# Patient Record
Sex: FEMALE | Race: WHITE | NOT HISPANIC OR LATINO | Employment: UNEMPLOYED | URBAN - METROPOLITAN AREA
[De-identification: names, ages, dates, MRNs, and addresses within clinical notes are randomized per-mention and may not be internally consistent; named-entity substitution may affect disease eponyms.]

---

## 2019-12-27 ENCOUNTER — OFFICE VISIT (OUTPATIENT)
Dept: FAMILY MEDICINE CLINIC | Facility: CLINIC | Age: 64
End: 2019-12-27
Payer: MEDICARE

## 2019-12-27 VITALS
HEIGHT: 66 IN | HEART RATE: 81 BPM | WEIGHT: 157 LBS | BODY MASS INDEX: 25.23 KG/M2 | DIASTOLIC BLOOD PRESSURE: 100 MMHG | SYSTOLIC BLOOD PRESSURE: 170 MMHG | OXYGEN SATURATION: 98 % | TEMPERATURE: 97.2 F | RESPIRATION RATE: 18 BRPM

## 2019-12-27 DIAGNOSIS — I10 HYPERTENSION, UNSPECIFIED TYPE: ICD-10-CM

## 2019-12-27 DIAGNOSIS — B19.20 HEPATITIS C VIRUS INFECTION WITHOUT HEPATIC COMA, UNSPECIFIED CHRONICITY: Primary | ICD-10-CM

## 2019-12-27 DIAGNOSIS — R07.9 CHEST PAIN, UNSPECIFIED TYPE: ICD-10-CM

## 2019-12-27 DIAGNOSIS — R06.02 SHORTNESS OF BREATH: ICD-10-CM

## 2019-12-27 PROCEDURE — 99213 OFFICE O/P EST LOW 20 MIN: CPT | Performed by: FAMILY MEDICINE

## 2019-12-27 RX ORDER — METOPROLOL SUCCINATE 50 MG/1
50 TABLET, EXTENDED RELEASE ORAL DAILY
Qty: 30 TABLET | Refills: 3 | Status: SHIPPED | OUTPATIENT
Start: 2019-12-27 | End: 2020-11-23

## 2019-12-27 NOTE — PROGRESS NOTES
[address]    NAME: Michael Stack  AGE: 60 y o  SEX: female  : 1955     DATE: 2019     Assessment and Plan:     Problem List Items Addressed This Visit     None      Visit Diagnoses     Hepatitis C virus infection without hepatic coma, unspecified chronicity    -  Primary    Relevant Orders    Hepatitis panel, acute    Hepatitis C RNA, quantitative, PCR    Hypertension, unspecified type        Relevant Medications    metoprolol succinate (TOPROL-XL) 50 mg 24 hr tablet    Other Relevant Orders    Lipid Panel with Direct LDL reflex    Comprehensive metabolic panel    CBC and differential    TSH, 3rd generation with Free T4 reflex    Ambulatory referral to Cardiology    Shortness of breath        Relevant Orders    Ambulatory referral to Cardiology    Chest pain, unspecified type        Relevant Orders    Ambulatory referral to Cardiology        Explained to patient and provided her with note for the rehabilitation home that she must go to the ER the next time she experiences chest pain  She is limited to 1 appointment per month, so expressed to patient the need to make Cardiology appointment as her priority  Will follow up with patient in 2-3 months  Immunizations and preventive care screenings were discussed with patient today  Appropriate education was printed on patient's after visit summary  Counseling:  Alcohol/drug use: discussed moderation in alcohol intake, the recommendations for healthy alcohol use, and avoidance of illicit drug use  Dental Health: discussed importance of regular tooth brushing, flossing, and dental visits  Injury prevention: discussed safety/seat belts, safety helmets, smoke detectors, carbon dioxide detectors, and smoking near bedding or upholstery  · Exercise: the importance of regular exercise/physical activity was discussed  Recommend exercise 3-5 times per week for at least 30 minutes  No follow-ups on file  Chief Complaint:     Chief Complaint   Patient presents with    Physical Exam     New Patient, has Hep C and needs blood pressure medication  Was taking Metoprolol 50mg      History of Present Illness:     Adult Annual Physical   Patient here for a comprehensive physical exam  The patient reports she is currently living in the [facility]  About 5 years ago her daughter was killed and after that she became addicted to pain medication  At that time she was taking about 30 Percocet pills per week  Adbout a year ago she started using Heroine, about 100 doollars worth per day  She checked herself into the [facility] for rehab and has not used any drugs since then  While at the rehab, she was not allowed to take her blood pressure medication Metoprolol 50mg daily  She has noticed that for the past few weeks she has been experiencing worsening of shortness of breath and chest pain  The shortness of breath is worse when she is walking, going up stairs and laying flat  She states experiencing crushing chest pain, and the sensation as though something heavy is sitting on her chest  Last time she had symptoms was yesterday  Denies any chest pain today  Patient diagnosed with Hep C in the past, but was never treated  Likely acquired from IV drug use  She has never seen a GI  Diet and Physical Activity  · Diet/Nutrition: poor diet  · Exercise: no formal exercise  Depression Screening  PHQ-9 Depression Screening    PHQ-9:    Frequency of the following problems over the past two weeks:            General Health  · Sleep: sleeps well and gets 4-6 hours of sleep on average  · Hearing: normal - bilateral   · Vision: no vision problems  · Dental: Cannot go to dentist since she is in rehab  /GYN Health  · Patient is: postmenopausal  · Last menstrual period: Several years ago   · Contraceptive method: not sexually active        Review of Systems:     Review of Systems   Constitutional: Positive for fatigue  Negative for chills and fever  HENT: Negative  Respiratory: Positive for shortness of breath  Negative for apnea, cough and wheezing  Cardiovascular: Positive for chest pain and palpitations  Negative for leg swelling  Gastrointestinal: Negative  Genitourinary: Negative  Musculoskeletal: Negative  Neurological: Negative  Psychiatric/Behavioral: Negative  Past Medical History:     Past Medical History:   Diagnosis Date    Hepatitis C     High blood pressure       Past Surgical History:     Past Surgical History:   Procedure Laterality Date    APPENDECTOMY        Social History:     Social History     Socioeconomic History    Marital status: Single     Spouse name: None    Number of children: None    Years of education: None    Highest education level: None   Occupational History    None   Social Needs    Financial resource strain: None    Food insecurity:     Worry: None     Inability: None    Transportation needs:     Medical: None     Non-medical: None   Tobacco Use    Smoking status: Former Smoker    Smokeless tobacco: Never Used   Substance and Sexual Activity    Alcohol use: Never     Frequency: Never    Drug use: Not Currently    Sexual activity: Not Currently   Lifestyle    Physical activity:     Days per week: None     Minutes per session: None    Stress: None   Relationships    Social connections:     Talks on phone: None     Gets together: None     Attends Jainism service: None     Active member of club or organization: None     Attends meetings of clubs or organizations: None     Relationship status: None    Intimate partner violence:     Fear of current or ex partner: None     Emotionally abused: None     Physically abused: None     Forced sexual activity: None   Other Topics Concern    None   Social History Narrative    None      Family History:     History reviewed  No pertinent family history  Current Medications:     Current Outpatient Medications   Medication Sig Dispense Refill    metoprolol succinate (TOPROL-XL) 50 mg 24 hr tablet Take 1 tablet (50 mg total) by mouth daily 30 tablet 3     No current facility-administered medications for this visit  Allergies:     No Known Allergies   Physical Exam:     /100 (BP Location: Left arm, Patient Position: Sitting, Cuff Size: Adult)   Pulse 81   Temp (!) 97 2 °F (36 2 °C) (Tympanic)   Resp 18   Ht 5' 6" (1 676 m)   Wt 71 2 kg (157 lb)   SpO2 98%   BMI 25 34 kg/m²     Physical Exam   Constitutional: She is oriented to person, place, and time  She appears well-developed and well-nourished  No distress  HENT:   Head: Normocephalic and atraumatic  Right Ear: External ear normal    Left Ear: External ear normal    Eyes: Pupils are equal, round, and reactive to light  EOM are normal    Neck: Normal range of motion  Neck supple  Cardiovascular: Normal rate, regular rhythm and intact distal pulses  Exam reveals no friction rub  No murmur heard  Pulmonary/Chest: Effort normal and breath sounds normal  No respiratory distress  She has no wheezes  She exhibits no tenderness  Abdominal: Soft  Bowel sounds are normal  She exhibits no distension and no mass  There is no tenderness  There is no rebound and no guarding  Musculoskeletal: Normal range of motion  She exhibits no edema, tenderness or deformity  Neurological: She is alert and oriented to person, place, and time  Skin: Skin is warm and dry  No rash noted  She is not diaphoretic  No erythema  No pallor  Psychiatric: She has a normal mood and affect  Her behavior is normal  Judgment and thought content normal    Nursing note and vitals reviewed        Daya Sidhu, DO  [address]

## 2019-12-27 NOTE — LETTER
December 29, 2019     Patient: Lawanna Severin   YOB: 1955   Date of Visit: 12/27/2019       To Whom it May Concern:    Lawanna Severin is under my professional care  She was seen in my office on 12/27/2019  It is strongly recommended that she get her blood work done today,  as ordered by me during our office visit  It has been advised to the patient that the next time she experiences CHEST PAIN, 911 must be called immediately and she be taken to the ER  If you have any questions or concerns, please don't hesitate to call           Sincerely,          Angela Cash, DO

## 2019-12-31 LAB
ALBUMIN SERPL-MCNC: 4.2 G/DL (ref 3.6–4.8)
ALBUMIN/GLOB SERPL: 1.4 {RATIO} (ref 1.2–2.2)
ALP SERPL-CCNC: 98 IU/L (ref 39–117)
ALT SERPL-CCNC: 64 IU/L (ref 0–32)
AST SERPL-CCNC: 57 IU/L (ref 0–40)
BASOPHILS # BLD AUTO: 0 X10E3/UL (ref 0–0.2)
BASOPHILS NFR BLD AUTO: 0 %
BILIRUB SERPL-MCNC: 0.5 MG/DL (ref 0–1.2)
BUN SERPL-MCNC: 19 MG/DL (ref 8–27)
BUN/CREAT SERPL: 21 (ref 12–28)
CALCIUM SERPL-MCNC: 9.4 MG/DL (ref 8.7–10.3)
CHLORIDE SERPL-SCNC: 105 MMOL/L (ref 96–106)
CHOLEST SERPL-MCNC: 144 MG/DL (ref 100–199)
CO2 SERPL-SCNC: 23 MMOL/L (ref 20–29)
CREAT SERPL-MCNC: 0.9 MG/DL (ref 0.57–1)
EOSINOPHIL # BLD AUTO: 0.1 X10E3/UL (ref 0–0.4)
EOSINOPHIL NFR BLD AUTO: 2 %
ERYTHROCYTE [DISTWIDTH] IN BLOOD BY AUTOMATED COUNT: 13.4 % (ref 12.3–15.4)
GLOBULIN SER-MCNC: 2.9 G/DL (ref 1.5–4.5)
GLUCOSE SERPL-MCNC: 116 MG/DL (ref 65–99)
HAV IGM SERPL QL IA: NEGATIVE
HBV CORE IGM SERPL QL IA: NEGATIVE
HBV SURFACE AG SERPL QL IA: NEGATIVE
HCT VFR BLD AUTO: 32.5 % (ref 34–46.6)
HCV AB S/CO SERPL IA: >11 S/CO RATIO (ref 0–0.9)
HCV RNA SERPL NAA+PROBE-ACNC: NORMAL IU/ML
HCV RNA SERPL NAA+PROBE-LOG IU: 5.49 LOG10 IU/ML
HDLC SERPL-MCNC: 47 MG/DL
HGB BLD-MCNC: 11.8 G/DL (ref 11.1–15.9)
IMM GRANULOCYTES # BLD: 0 X10E3/UL (ref 0–0.1)
IMM GRANULOCYTES NFR BLD: 0 %
LDLC SERPL CALC-MCNC: 80 MG/DL (ref 0–99)
LDLC SERPL DIRECT ASSAY-MCNC: 86 MG/DL (ref 0–99)
LYMPHOCYTES # BLD AUTO: 2 X10E3/UL (ref 0.7–3.1)
LYMPHOCYTES NFR BLD AUTO: 43 %
MCH RBC QN AUTO: 32.5 PG (ref 26.6–33)
MCHC RBC AUTO-ENTMCNC: 36.3 G/DL (ref 31.5–35.7)
MCV RBC AUTO: 90 FL (ref 79–97)
MONOCYTES # BLD AUTO: 0.4 X10E3/UL (ref 0.1–0.9)
MONOCYTES NFR BLD AUTO: 8 %
NEUTROPHILS # BLD AUTO: 2.1 X10E3/UL (ref 1.4–7)
NEUTROPHILS NFR BLD AUTO: 47 %
PLATELET # BLD AUTO: 173 X10E3/UL (ref 150–450)
POTASSIUM SERPL-SCNC: 3.8 MMOL/L (ref 3.5–5.2)
PROT SERPL-MCNC: 7.1 G/DL (ref 6–8.5)
RBC # BLD AUTO: 3.63 X10E6/UL (ref 3.77–5.28)
SL AMB EGFR AFRICAN AMERICAN: 79 ML/MIN/1.73
SL AMB EGFR NON AFRICAN AMERICAN: 69 ML/MIN/1.73
SL AMB VLDL CHOLESTEROL CALC: 17 MG/DL (ref 5–40)
SODIUM SERPL-SCNC: 142 MMOL/L (ref 134–144)
TEST INFORMATION: NORMAL
TRIGL SERPL-MCNC: 87 MG/DL (ref 0–149)
TSH SERPL DL<=0.005 MIU/L-ACNC: 1.16 UIU/ML (ref 0.45–4.5)
WBC # BLD AUTO: 4.6 X10E3/UL (ref 3.4–10.8)

## 2020-01-17 ENCOUNTER — CONSULT (OUTPATIENT)
Dept: CARDIOLOGY CLINIC | Facility: CLINIC | Age: 65
End: 2020-01-17
Payer: MEDICARE

## 2020-01-17 VITALS
HEIGHT: 66 IN | BODY MASS INDEX: 26.2 KG/M2 | HEART RATE: 78 BPM | SYSTOLIC BLOOD PRESSURE: 130 MMHG | OXYGEN SATURATION: 98 % | DIASTOLIC BLOOD PRESSURE: 70 MMHG | WEIGHT: 163 LBS

## 2020-01-17 DIAGNOSIS — Z82.49 FAMILY HISTORY OF EARLY CAD: ICD-10-CM

## 2020-01-17 DIAGNOSIS — R06.02 SHORTNESS OF BREATH: ICD-10-CM

## 2020-01-17 DIAGNOSIS — R07.9 CHEST PAIN, UNSPECIFIED TYPE: ICD-10-CM

## 2020-01-17 DIAGNOSIS — I10 ESSENTIAL HYPERTENSION: ICD-10-CM

## 2020-01-17 DIAGNOSIS — B19.20 HEPATITIS C VIRUS INFECTION WITHOUT HEPATIC COMA, UNSPECIFIED CHRONICITY: ICD-10-CM

## 2020-01-17 DIAGNOSIS — R74.8 ELEVATED LIVER ENZYMES: ICD-10-CM

## 2020-01-17 PROCEDURE — 93000 ELECTROCARDIOGRAM COMPLETE: CPT | Performed by: INTERNAL MEDICINE

## 2020-01-17 PROCEDURE — 99205 OFFICE O/P NEW HI 60 MIN: CPT | Performed by: INTERNAL MEDICINE

## 2020-01-17 NOTE — PROGRESS NOTES
Consultation - Cardiology Office  [facility]  Lee Benites 60 y o  female MRN: [id number]  : 1955  Unit/Bed#:  Encounter: [id number]      Assessment:     1  Chest pain, unspecified type    2  Shortness of breath    3  Family history of early CAD    3  Essential hypertension    5  Elevated liver enzymes    6  Hepatitis C virus infection without hepatic coma, unspecified chronicity        Discussion summary and Plan:    1  Chest pain  Patient is having now chest pain with exertion particularly when she climb stairs  She used to be a walker now she could not do stuff because she get episodes of chest pressure with shortness of breath and she feels better when she slows down  This has been going on progressively for the last 3-4 months  In view of that due to her risk factor she will scheduled for cardiac catheterization all risks benefit all discussed with the patient including risk of bleeding heart attack stroke even death she agrees with the plan  Will check echo Doppler for LV function  2  Shortness of breath with exertion  Patient was able to do those activities before  Need to rule out cardiomyopathy and obstructive disease  Scheduled for cardiac catheterization check echo Doppler    3  Family history of coronary artery disease  Both her parents have heart attack in early 62s  Father  of MI     4  Elevated liver enzyme  During workup patient is found to have hepatitis C positive  Refer her to GI    5  Essential hypertension  Seems to be well controlled with metoprolol XL  Labs ordered  Echo Doppler schedule cardiac catheterization schedule she need to follow up with Gastroenterology regarding her hep C  All risks benefit alternate discussed with her she wishes to proceed for cardiac catheterization  Thank you for your consultation  If you have any question please call me at [phone]    Counseling :  A description of the counseling    Goals and Barriers  Patient's ability to self care: Yes  Medication side effect reviewed with patient in detail and all their questions answered to their satisfaction  Primary Care Physician Requesting Consult: Ivonne Agosto MD    Reason for Consult / Principal Problem:         HPI :     Nitza Dixon is a 60y o  year old female who was referred by primary care doctor for episodes of shortness of breath and chest pain  Patient who works as a medical assistant has noted for the last for 5 months she gets episodes of chest pain and shortness of breath when she climbs stairs  She who was a walker can easily walk few miles now cut down her walking as she get chest pressure and shortness of breath  She has to stop to breathe better and then the pressure goes away  She has noted this last 3 4 months and is progressively getting worse  Recently blood test shows she had hepatitis C and has mildly elevated liver enzyme  Her other electrolytes are acceptable  She can easily climb hill earlier now she does not does lose activities  She was started on metoprolol XL 50 mg daily it did help little bit but not completely  It decreased episodes but not fully gone  She is not taking any aspirin or cholesterol medicine at this time  Today her heart rate is 70 beats per minute and she has a nonspecific ST changes on EKG  No nausea no vomiting no fever no chills  She has a family history of heart problem both her parents had MI in the 62s  Mother had stents father  of MI in the 62s  One of the other sister has heart problems  She used to smoke about 2 packs a day she quit 3 years ago  Currently she is not smoking  Review of Systems   Constitutional: Positive for fatigue  Negative for activity change, chills, diaphoresis, fever and unexpected weight change  HENT: Negative for congestion  Eyes: Negative for discharge and redness  Respiratory: Positive for shortness of breath   Negative for cough, chest tightness and wheezing  Cardiovascular: Positive for chest pain  Negative for palpitations and leg swelling  Gastrointestinal: Negative for abdominal pain, diarrhea and nausea  Endocrine: Negative  Genitourinary: Negative for decreased urine volume and urgency  Musculoskeletal: Negative  Negative for arthralgias, back pain and gait problem  Skin: Negative for rash and wound  Allergic/Immunologic: Negative  Neurological: Negative for dizziness, seizures, syncope, weakness, light-headedness and headaches  Hematological: Negative  Psychiatric/Behavioral: Negative for agitation and confusion  The patient is nervous/anxious  Historical Information   Past Medical History:   Diagnosis Date    Hepatitis C     High blood pressure      Past Surgical History:   Procedure Laterality Date    APPENDECTOMY       Social History     Substance and Sexual Activity   Alcohol Use Never    Frequency: Never     Social History     Substance and Sexual Activity   Drug Use Not Currently     Social History     Tobacco Use   Smoking Status Former Smoker   Smokeless Tobacco Never Used     Family History:   Family History   Problem Relation Age of Onset    Heart attack Mother     Heart attack Father        Meds/Allergies     No Known Allergies    Current Outpatient Medications:     metoprolol succinate (TOPROL-XL) 50 mg 24 hr tablet, Take 1 tablet (50 mg total) by mouth daily, Disp: 30 tablet, Rfl: 3    Vitals: Blood pressure 130/70, pulse 78, height 5' 6" (1 676 m), weight 73 9 kg (163 lb), SpO2 98 %  Body mass index is 26 31 kg/m²    Vitals:    01/17/20 1350   Weight: 73 9 kg (163 lb)     BP Readings from Last 3 Encounters:   01/17/20 130/70   12/27/19 170/100         Physical Exam    Neurologic:  Alert & oriented x 3, no new focal deficits, Not in any acute distress,  Constitutional:  Well developed, well nourished, non-toxic appearance   Eyes:  Pupil equal and reacting to light, conjunctiva normal, No JVP, No LNP   HENT:  Atraumatic, oropharynx moist, Neck- normal range of motion, no tenderness,  Neck supple   Respiratory:  Bilateral air entry, mostly clear to auscultation  Cardiovascular: S1-S2 regular with a 2/6 ejection systolic murmur and S4 is present  GI:  Soft, nondistended, normal bowel sounds, nontender, no hepatosplenomegaly appreciated  Musculoskeletal:  No edema, no tenderness, no deformities  Skin:  Well hydrated, no rash   Lymphatic:  No lymphadenopathy noted   Extremities:  No edema and distal pulses are present    Diagnostic Studies Review Cardio:    None recent    EK lead EKG done 2020 shows normal sinus rhythm nonspecific ST changes heart rate 70 beats per minute    Cardiac testing:     Lab Review   Lab Results   Component Value Date    WBC 4 6 2019    HGB 11 8 2019    HCT 32 5 (L) 2019    MCV 90 2019    RDW 13 4 2019     2019     BMP:  Lab Results   Component Value Date    SODIUM 142 2019    K 3 8 2019     2019    CO2 23 2019    BUN 19 2019    CREATININE 0 90 2019    GLUC 116 (H) 2019     LFT:  Lab Results   Component Value Date    AST 57 (H) 2019    ALT 64 (H) 2019    TP 7 1 2019    ALB 4 2 2019      Lipid Profile:   Lab Results   Component Value Date    CHOLESTEROL 144 2019    HDL 47 2019    TRIG 87 2019     Lab Results   Component Value Date    CHOLESTEROL 144 2019       Recent Results (from the past 672 hour(s))   CBC and differential    Collection Time: 19  2:10 PM   Result Value Ref Range    White Blood Cell Count 4 6 3 4 - 10 8 x10E3/uL    Red Blood Cell Count 3 63 (L) 3 77 - 5 28 x10E6/uL    Hemoglobin 11 8 11 1 - 15 9 g/dL    HCT 32 5 (L) 34 0 - 46 6 %    MCV 90 79 - 97 fL    MCH 32 5 26 6 - 33 0 pg    MCHC 36 3 (H) 31 5 - 35 7 g/dL    RDW 13 4 12 3 - 15 4 %    Platelet Count 197 375 - 450 x10E3/uL    Neutrophils 47 Not Estab  % Lymphocytes 43 Not Estab  %    Monocytes 8 Not Estab  %    Eosinophils 2 Not Estab  %    Basophils PCT 0 Not Estab  %    Neutrophils (Absolute) 2 1 1 4 - 7 0 x10E3/uL    Lymphocytes (Absolute) 2 0 0 7 - 3 1 x10E3/uL    Monocytes (Absolute) 0 4 0 1 - 0 9 x10E3/uL    Eosinophils (Absolute) 0 1 0 0 - 0 4 x10E3/uL    Basophils ABS 0 0 0 0 - 0 2 x10E3/uL    Immature Granulocytes 0 Not Estab  %    Immature Granulocytes (Absolute) 0 0 0 0 - 0 1 x10E3/uL   Comprehensive metabolic panel    Collection Time: 12/27/19  2:10 PM   Result Value Ref Range    Glucose, Random 116 (H) 65 - 99 mg/dL    BUN 19 8 - 27 mg/dL    Creatinine 0 90 0 57 - 1 00 mg/dL    eGFR Non  69 >59 mL/min/1 73    eGFR  79 >59 mL/min/1 73    SL AMB BUN/CREATININE RATIO 21 12 - 28    Sodium 142 134 - 144 mmol/L    Potassium 3 8 3 5 - 5 2 mmol/L    Chloride 105 96 - 106 mmol/L    CO2 23 20 - 29 mmol/L    CALCIUM 9 4 8 7 - 10 3 mg/dL    Protein, Total 7 1 6 0 - 8 5 g/dL    Albumin 4 2 3 6 - 4 8 g/dL    Globulin, Total 2 9 1 5 - 4 5 g/dL    Albumin/Globulin Ratio 1 4 1 2 - 2 2    TOTAL BILIRUBIN 0 5 0 0 - 1 2 mg/dL    Alk Phos Isoenzymes 98 39 - 117 IU/L    AST 57 (H) 0 - 40 IU/L    ALT 64 (H) 0 - 32 IU/L   Lipid panel    Collection Time: 12/27/19  2:10 PM   Result Value Ref Range    Cholesterol, Total 144 100 - 199 mg/dL    Triglycerides 87 0 - 149 mg/dL    HDL 47 >39 mg/dL    VLDL Cholesterol Calculated 17 5 - 40 mg/dL    LDL Direct 80 0 - 99 mg/dL   Hepatitis panel, acute    Collection Time: 12/27/19  2:10 PM   Result Value Ref Range    Hep A Ab, IgM Negative Negative    HBsAg Screen Negative Negative    Hep B Core Ab, IgM Negative Negative    HEP C AB >11 0 (H) 0 0 - 0 9 s/co ratio   HCV RT-PCR, Quant (Non-Graph)    Collection Time: 12/27/19  2:10 PM   Result Value Ref Range    HCV PCR Quantitative 307,260 IU/mL    HCV Quantitative Log 5 488 log10 IU/mL    Test Information Comment    LDL cholesterol, direct    Collection Time: 12/27/19  2:10 PM   Result Value Ref Range    LDL Cholesterol 86 0 - 99 mg/dL   TSH, 3rd generation    Collection Time: 12/27/19  2:10 PM   Result Value Ref Range    TSH 1 160 0 450 - 4 500 uIU/mL           Dr Handy Valdez MD [facility]      "This note has been constructed using a voice recognition system  Therefore there may be syntax, spelling, and/or grammatical errors   Please call if you have any questions  "

## 2020-01-17 NOTE — H&P (VIEW-ONLY)
Consultation - Cardiology Office  [facility]  Celestino Rowland 60 y o  female MRN: [id number]  : 1955  Unit/Bed#:  Encounter: [id number]      Assessment:     1  Chest pain, unspecified type    2  Shortness of breath    3  Family history of early CAD    3  Essential hypertension    5  Elevated liver enzymes    6  Hepatitis C virus infection without hepatic coma, unspecified chronicity        Discussion summary and Plan:    1  Chest pain  Patient is having now chest pain with exertion particularly when she climb stairs  She used to be a walker now she could not do stuff because she get episodes of chest pressure with shortness of breath and she feels better when she slows down  This has been going on progressively for the last 3-4 months  In view of that due to her risk factor she will scheduled for cardiac catheterization all risks benefit all discussed with the patient including risk of bleeding heart attack stroke even death she agrees with the plan  Will check echo Doppler for LV function  2  Shortness of breath with exertion  Patient was able to do those activities before  Need to rule out cardiomyopathy and obstructive disease  Scheduled for cardiac catheterization check echo Doppler    3  Family history of coronary artery disease  Both her parents have heart attack in early 62s  Father  of MI     4  Elevated liver enzyme  During workup patient is found to have hepatitis C positive  Refer her to GI    5  Essential hypertension  Seems to be well controlled with metoprolol XL  Labs ordered  Echo Doppler schedule cardiac catheterization schedule she need to follow up with Gastroenterology regarding her hep C  All risks benefit alternate discussed with her she wishes to proceed for cardiac catheterization  Thank you for your consultation  If you have any question please call me at [phone]    Counseling :  A description of the counseling    Goals and Barriers  Patient's ability to self care: Yes  Medication side effect reviewed with patient in detail and all their questions answered to their satisfaction  Primary Care Physician Requesting Consult: Stefanie Pacheco MD    Reason for Consult / Principal Problem:         HPI :     Lee Benites is a 60y o  year old female who was referred by primary care doctor for episodes of shortness of breath and chest pain  Patient who works as a medical assistant has noted for the last for 5 months she gets episodes of chest pain and shortness of breath when she climbs stairs  She who was a walker can easily walk few miles now cut down her walking as she get chest pressure and shortness of breath  She has to stop to breathe better and then the pressure goes away  She has noted this last 3 4 months and is progressively getting worse  Recently blood test shows she had hepatitis C and has mildly elevated liver enzyme  Her other electrolytes are acceptable  She can easily climb hill earlier now she does not does lose activities  She was started on metoprolol XL 50 mg daily it did help little bit but not completely  It decreased episodes but not fully gone  She is not taking any aspirin or cholesterol medicine at this time  Today her heart rate is 70 beats per minute and she has a nonspecific ST changes on EKG  No nausea no vomiting no fever no chills  She has a family history of heart problem both her parents had MI in the 62s  Mother had stents father  of MI in the 62s  One of the other sister has heart problems  She used to smoke about 2 packs a day she quit 3 years ago  Currently she is not smoking  Review of Systems   Constitutional: Positive for fatigue  Negative for activity change, chills, diaphoresis, fever and unexpected weight change  HENT: Negative for congestion  Eyes: Negative for discharge and redness  Respiratory: Positive for shortness of breath   Negative for cough, chest tightness and wheezing  Cardiovascular: Positive for chest pain  Negative for palpitations and leg swelling  Gastrointestinal: Negative for abdominal pain, diarrhea and nausea  Endocrine: Negative  Genitourinary: Negative for decreased urine volume and urgency  Musculoskeletal: Negative  Negative for arthralgias, back pain and gait problem  Skin: Negative for rash and wound  Allergic/Immunologic: Negative  Neurological: Negative for dizziness, seizures, syncope, weakness, light-headedness and headaches  Hematological: Negative  Psychiatric/Behavioral: Negative for agitation and confusion  The patient is nervous/anxious  Historical Information   Past Medical History:   Diagnosis Date    Hepatitis C     High blood pressure      Past Surgical History:   Procedure Laterality Date    APPENDECTOMY       Social History     Substance and Sexual Activity   Alcohol Use Never    Frequency: Never     Social History     Substance and Sexual Activity   Drug Use Not Currently     Social History     Tobacco Use   Smoking Status Former Smoker   Smokeless Tobacco Never Used     Family History:   Family History   Problem Relation Age of Onset    Heart attack Mother     Heart attack Father        Meds/Allergies     No Known Allergies    Current Outpatient Medications:     metoprolol succinate (TOPROL-XL) 50 mg 24 hr tablet, Take 1 tablet (50 mg total) by mouth daily, Disp: 30 tablet, Rfl: 3    Vitals: Blood pressure 130/70, pulse 78, height 5' 6" (1 676 m), weight 73 9 kg (163 lb), SpO2 98 %  Body mass index is 26 31 kg/m²    Vitals:    01/17/20 1350   Weight: 73 9 kg (163 lb)     BP Readings from Last 3 Encounters:   01/17/20 130/70   12/27/19 170/100         Physical Exam    Neurologic:  Alert & oriented x 3, no new focal deficits, Not in any acute distress,  Constitutional:  Well developed, well nourished, non-toxic appearance   Eyes:  Pupil equal and reacting to light, conjunctiva normal, No JVP, No LNP   HENT:  Atraumatic, oropharynx moist, Neck- normal range of motion, no tenderness,  Neck supple   Respiratory:  Bilateral air entry, mostly clear to auscultation  Cardiovascular: S1-S2 regular with a 2/6 ejection systolic murmur and S4 is present  GI:  Soft, nondistended, normal bowel sounds, nontender, no hepatosplenomegaly appreciated  Musculoskeletal:  No edema, no tenderness, no deformities  Skin:  Well hydrated, no rash   Lymphatic:  No lymphadenopathy noted   Extremities:  No edema and distal pulses are present    Diagnostic Studies Review Cardio:    None recent    EK lead EKG done 2020 shows normal sinus rhythm nonspecific ST changes heart rate 70 beats per minute    Cardiac testing:     Lab Review   Lab Results   Component Value Date    WBC 4 6 2019    HGB 11 8 2019    HCT 32 5 (L) 2019    MCV 90 2019    RDW 13 4 2019     2019     BMP:  Lab Results   Component Value Date    SODIUM 142 2019    K 3 8 2019     2019    CO2 23 2019    BUN 19 2019    CREATININE 0 90 2019    GLUC 116 (H) 2019     LFT:  Lab Results   Component Value Date    AST 57 (H) 2019    ALT 64 (H) 2019    TP 7 1 2019    ALB 4 2 2019      Lipid Profile:   Lab Results   Component Value Date    CHOLESTEROL 144 2019    HDL 47 2019    TRIG 87 2019     Lab Results   Component Value Date    CHOLESTEROL 144 2019       Recent Results (from the past 672 hour(s))   CBC and differential    Collection Time: 19  2:10 PM   Result Value Ref Range    White Blood Cell Count 4 6 3 4 - 10 8 x10E3/uL    Red Blood Cell Count 3 63 (L) 3 77 - 5 28 x10E6/uL    Hemoglobin 11 8 11 1 - 15 9 g/dL    HCT 32 5 (L) 34 0 - 46 6 %    MCV 90 79 - 97 fL    MCH 32 5 26 6 - 33 0 pg    MCHC 36 3 (H) 31 5 - 35 7 g/dL    RDW 13 4 12 3 - 15 4 %    Platelet Count 195 854 - 450 x10E3/uL    Neutrophils 47 Not Estab  % Lymphocytes 43 Not Estab  %    Monocytes 8 Not Estab  %    Eosinophils 2 Not Estab  %    Basophils PCT 0 Not Estab  %    Neutrophils (Absolute) 2 1 1 4 - 7 0 x10E3/uL    Lymphocytes (Absolute) 2 0 0 7 - 3 1 x10E3/uL    Monocytes (Absolute) 0 4 0 1 - 0 9 x10E3/uL    Eosinophils (Absolute) 0 1 0 0 - 0 4 x10E3/uL    Basophils ABS 0 0 0 0 - 0 2 x10E3/uL    Immature Granulocytes 0 Not Estab  %    Immature Granulocytes (Absolute) 0 0 0 0 - 0 1 x10E3/uL   Comprehensive metabolic panel    Collection Time: 12/27/19  2:10 PM   Result Value Ref Range    Glucose, Random 116 (H) 65 - 99 mg/dL    BUN 19 8 - 27 mg/dL    Creatinine 0 90 0 57 - 1 00 mg/dL    eGFR Non  69 >59 mL/min/1 73    eGFR  79 >59 mL/min/1 73    SL AMB BUN/CREATININE RATIO 21 12 - 28    Sodium 142 134 - 144 mmol/L    Potassium 3 8 3 5 - 5 2 mmol/L    Chloride 105 96 - 106 mmol/L    CO2 23 20 - 29 mmol/L    CALCIUM 9 4 8 7 - 10 3 mg/dL    Protein, Total 7 1 6 0 - 8 5 g/dL    Albumin 4 2 3 6 - 4 8 g/dL    Globulin, Total 2 9 1 5 - 4 5 g/dL    Albumin/Globulin Ratio 1 4 1 2 - 2 2    TOTAL BILIRUBIN 0 5 0 0 - 1 2 mg/dL    Alk Phos Isoenzymes 98 39 - 117 IU/L    AST 57 (H) 0 - 40 IU/L    ALT 64 (H) 0 - 32 IU/L   Lipid panel    Collection Time: 12/27/19  2:10 PM   Result Value Ref Range    Cholesterol, Total 144 100 - 199 mg/dL    Triglycerides 87 0 - 149 mg/dL    HDL 47 >39 mg/dL    VLDL Cholesterol Calculated 17 5 - 40 mg/dL    LDL Direct 80 0 - 99 mg/dL   Hepatitis panel, acute    Collection Time: 12/27/19  2:10 PM   Result Value Ref Range    Hep A Ab, IgM Negative Negative    HBsAg Screen Negative Negative    Hep B Core Ab, IgM Negative Negative    HEP C AB >11 0 (H) 0 0 - 0 9 s/co ratio   HCV RT-PCR, Quant (Non-Graph)    Collection Time: 12/27/19  2:10 PM   Result Value Ref Range    HCV PCR Quantitative 307,260 IU/mL    HCV Quantitative Log 5 488 log10 IU/mL    Test Information Comment    LDL cholesterol, direct    Collection Time: 12/27/19  2:10 PM   Result Value Ref Range    LDL Cholesterol 86 0 - 99 mg/dL   TSH, 3rd generation    Collection Time: 12/27/19  2:10 PM   Result Value Ref Range    TSH 1 160 0 450 - 4 500 uIU/mL           Dr Suraj Vides MD [facility] [place]      "This note has been constructed using a voice recognition system  Therefore there may be syntax, spelling, and/or grammatical errors   Please call if you have any questions  "

## 2020-02-06 ENCOUNTER — TELEPHONE (OUTPATIENT)
Dept: RADIOLOGY | Facility: HOSPITAL | Age: 65
End: 2020-02-06

## 2020-02-07 ENCOUNTER — HOSPITAL ENCOUNTER (OUTPATIENT)
Dept: NON INVASIVE DIAGNOSTICS | Facility: HOSPITAL | Age: 65
Discharge: HOME | End: 2020-02-07
Attending: INTERNAL MEDICINE | Admitting: INTERNAL MEDICINE
Payer: MEDICARE

## 2020-02-07 VITALS
SYSTOLIC BLOOD PRESSURE: 158 MMHG | OXYGEN SATURATION: 97 % | RESPIRATION RATE: 18 BRPM | DIASTOLIC BLOOD PRESSURE: 83 MMHG | HEART RATE: 78 BPM

## 2020-02-07 DIAGNOSIS — R07.9 CHEST PAIN, UNSPECIFIED TYPE: ICD-10-CM

## 2020-02-07 DIAGNOSIS — R06.02 SHORTNESS OF BREATH: ICD-10-CM

## 2020-02-07 DIAGNOSIS — Z82.49 FAMILY HISTORY OF EARLY CAD: ICD-10-CM

## 2020-02-07 PROCEDURE — C1769 GUIDE WIRE: HCPCS

## 2020-02-07 PROCEDURE — C1894 INTRO/SHEATH, NON-LASER: HCPCS

## 2020-02-07 PROCEDURE — 93458 L HRT ARTERY/VENTRICLE ANGIO: CPT

## 2020-02-07 PROCEDURE — NC001 PR NO CHARGE: Performed by: INTERNAL MEDICINE

## 2020-02-07 PROCEDURE — C1887 CATHETER, GUIDING: HCPCS

## 2020-02-07 PROCEDURE — 99152 MOD SED SAME PHYS/QHP 5/>YRS: CPT | Performed by: INTERNAL MEDICINE

## 2020-02-07 PROCEDURE — 93458 L HRT ARTERY/VENTRICLE ANGIO: CPT | Performed by: INTERNAL MEDICINE

## 2020-02-07 RX ORDER — MIDAZOLAM HYDROCHLORIDE 2 MG/2ML
INJECTION, SOLUTION INTRAMUSCULAR; INTRAVENOUS CODE/TRAUMA/SEDATION MEDICATION
Status: COMPLETED | OUTPATIENT
Start: 2020-02-07 | End: 2020-02-07

## 2020-02-07 RX ORDER — HEPARIN SODIUM 1000 [USP'U]/ML
INJECTION, SOLUTION INTRAVENOUS; SUBCUTANEOUS CODE/TRAUMA/SEDATION MEDICATION
Status: COMPLETED | OUTPATIENT
Start: 2020-02-07 | End: 2020-02-07

## 2020-02-07 RX ORDER — LABETALOL 20 MG/4 ML (5 MG/ML) INTRAVENOUS SYRINGE
CODE/TRAUMA/SEDATION MEDICATION
Status: COMPLETED | OUTPATIENT
Start: 2020-02-07 | End: 2020-02-07

## 2020-02-07 RX ORDER — VERAPAMIL HYDROCHLORIDE 2.5 MG/ML
INJECTION, SOLUTION INTRAVENOUS CODE/TRAUMA/SEDATION MEDICATION
Status: COMPLETED | OUTPATIENT
Start: 2020-02-07 | End: 2020-02-07

## 2020-02-07 RX ORDER — NITROGLYCERIN 20 MG/100ML
INJECTION INTRAVENOUS CODE/TRAUMA/SEDATION MEDICATION
Status: COMPLETED | OUTPATIENT
Start: 2020-02-07 | End: 2020-02-07

## 2020-02-07 RX ORDER — SODIUM CHLORIDE 9 MG/ML
100 INJECTION, SOLUTION INTRAVENOUS CONTINUOUS
Status: DISCONTINUED | OUTPATIENT
Start: 2020-02-07 | End: 2020-02-08 | Stop reason: HOSPADM

## 2020-02-07 RX ADMIN — NITROGLYCERIN 200 MCG: 20 INJECTION INTRAVENOUS at 08:36

## 2020-02-07 RX ADMIN — HEPARIN SODIUM 4000 UNITS: 1000 INJECTION INTRAVENOUS; SUBCUTANEOUS at 08:36

## 2020-02-07 RX ADMIN — MIDAZOLAM HYDROCHLORIDE 1 MG: 1 INJECTION, SOLUTION INTRAMUSCULAR; INTRAVENOUS at 08:46

## 2020-02-07 RX ADMIN — MIDAZOLAM HYDROCHLORIDE 1 MG: 1 INJECTION, SOLUTION INTRAMUSCULAR; INTRAVENOUS at 08:41

## 2020-02-07 RX ADMIN — IOHEXOL 75 ML: 350 INJECTION, SOLUTION INTRAVENOUS at 08:56

## 2020-02-07 RX ADMIN — LABETALOL 20 MG/4 ML (5 MG/ML) INTRAVENOUS SYRINGE 10 MG: at 08:33

## 2020-02-07 RX ADMIN — NITROGLYCERIN 200 MCG: 20 INJECTION INTRAVENOUS at 08:43

## 2020-02-07 RX ADMIN — MIDAZOLAM HYDROCHLORIDE 2 MG: 1 INJECTION, SOLUTION INTRAMUSCULAR; INTRAVENOUS at 08:32

## 2020-02-07 RX ADMIN — VERAPAMIL HYDROCHLORIDE 2.5 MG: 2.5 INJECTION, SOLUTION INTRAVENOUS at 08:36

## 2020-02-07 NOTE — PROCEDURES
Cardiac Catheterization Operative Report    [facility] President  [phone]  2/7/2020  Dian Hillman MD    Procedure performed:    Right coronary angiography  Left coronary angiography  LV gram   Fluoroscopy    Indication:  Exertional chest pain with shortness of breath    Interventionist Cardiologist : Dr Lian Baez MD [facility]    Brief history:  Patient is 63 year woman with history of hep C, previous history of a of drug abuse was having recurrent episodes of chest pain hence scheduled for cardiac catheterization  Procedure Details: The risks, benefits, complications, including risk of stroke, heart attack, bleeding and even death but not limited to it discussed with the patient  Other treatment options, alternatives and expected outcomes were discussed with the patient and family  The patient and/or family concurred with the proposed plan, giving informed consent  Patient was brought to the cath lab after IV hydration was begun and oral premedication was given  Patient was further sedated with midazolam and fentanyl  Patient was prepped and draped in the usual manner  Using the modified Seldinger access technique, a 5 Turkmen sheath was placed in the right radial artery without any complications  Patient was given intra-arterial nitroglycerin and IV verapamil  IV Heparin was administered  A left heart catheterization was performed  Left and right coronary angiograms were  done  End of the procedure patient was transferred to holding area without any complications  Patient tolerated the procedure well  After the procedure was completed, sedation was stopped and the sheaths and catheters were all removed  Hemostasis was achieved with vasc band as per protocol  Equipment used:     1  JR4 for right coronary angiography  2  4 F JL 3 5 for right coronary angiography  3  LV gram  with 4 F pigtail catheter    Findings:  1  Dominance: Right dominant coronary system    2   Left main Coronary artery: Left main is a normal-size vessel  It bifurcates into large LAD and a nondominant circumflex system  Mild luminal regularities of left main noted  3  Left anterior descending artery: LAD is a large-size vessel and  it has luminal irregularities causing about 25-35% stenosis proximally, it gives a diagonal which has mild luminal regularities mid and distal LAD has around 25% stenosis no focal stenosis seen  4  Circumflex Coronary artery: Circumflex is nondominant but medium to large size artery  It has mid eccentric 60-65% stenosis  Distal circumflex has mild luminal regularities  5  Right coronary artery: RCA is a large dominant artery it has proximal around 35% stenosis mid it in of 25% stenosis  Distal RCA has around 35% stenosis  Distal branches has luminal regularities  6  Left ventriculogram: LV gram done in LOPEZ view shows normal LVEDP there was no gradient across aortic valve and patient's EF is around 55 60%  Estimated Blood Loss:  Minimal         Complications:  None; patient tolerated the procedure well  Impression: Cardiac catheterization shows patient has intermediate mid circumflex lesion which may need further evaluation if she continued to remain symptomatic  Will intensify medical therapy  She will be started on statin therapy  She does have history of hep C  Recommendation: Continue medical therapy  Continue risk factor modification and intensify medical therapy  If remains symptomatic may need FFR of circumflex lesion  Disposition: Patient transferred to holding area/monitoring in hemodynamically stable condition  Condition: Stable      Dr Layne Hashimoto, MD [facility] [place]        "This note has been constructed using a voice recognition system  Therefore there may be syntax, spelling, and/or grammatical errors   Please call if you have any questions  "

## 2020-02-07 NOTE — DISCHARGE INSTRUCTIONS
Cardiac Catheterization     WHAT YOU NEED TO KNOW:   A cardiac catheterization is a procedure to look at your heart and its blood vessels  Healthcare providers can measure oxygen levels and pressures in your heart  They can also fix problems with your valves, blood vessels, or the walls of your heart  You may need this procedure if you have chest pain, heart disease, or your heart is not working properly  DISCHARGE INSTRUCTIONS:     No driving for 24 hours, because you were sedated  Sedation: Avoid making any legal/major decisions today, as the sedation may inhibit your decision making  Also avoid alcohol today, as the sedation may heighten the effects of the alcohol  Apply firm, steady pressure directly over the wound if bleeding occurs  A small amount of bleeding from your wound is possible  Apply pressure with a clean gauze or towel for 5 to 10 minutes  Call 911 if bleeding becomes heavy or does not stop  Do not attempt to drive yourself to the hospital     Bathing: You will be able to shower the day after your procedure  Remove your bandage before you shower  Carefully wash the wound with soap and water  Pat the area dry and apply a clean band-aid  Do not take baths or go in hot tubs or pools for about one week  Care for your wound as directed: Keep your dressing clean and dry  Monitor your wound everyday for signs of infection such as redness, swelling, or pus  Mild bruising is normal and expected  Do not put powders, lotions, or creams on your wound for about one week  Do not lift anything heavier than 5 pounds for about 5 days  Heavy lifting can put stress on your wound and cause bleeding  If an artery in your wrist was used, do not push or pull with the arm that was used for the procedure - pretend like your wrist is broken  Do not do vigorous activity for at least 48 hours  Vigorous activity may cause bleeding from your wound  Rest and do quiet activities   Short walks to the bathroom and around the house are okay  Ask your healthcare provider when you can return to your normal activities  Drink liquids to flush the contrast liquid from your body and prevent blood clots  Ask how much liquid to drink each day and which liquids are best for you  Call 911 for any of the following:   · You have any of the following signs of a heart attack:    ? Squeezing, pressure, or pain in your chest that lasts longer than 5 minutes or returns   ? Discomfort or pain in your back, neck, jaw, stomach, or arm    ? Trouble breathing   ? Nausea or vomiting   ? Lightheadedness or a sudden cold sweat, especially with chest pain or trouble breathing     · You have any of the following signs of a stroke:    ? Numbness or drooping on one side of your face    ? Weakness in an arm or leg   ? Confusion or difficulty speaking   ? Dizziness, a severe headache, or vision loss     · You feel lightheaded, short of breath, and have chest pain  · You cough up blood  · You have trouble breathing  · You cannot stop the bleeding from your wound even after you hold firm pressure for 10 minutes  ·   Seek care immediately if:   · Blood soaks through your bandage  · Your stitches come apart  · Your arm or leg feels numb, cool, or looks pale  · Your wound gets swollen quickly  Contact your healthcare provider if:   · You have a fever or chills  · Your wound is red, swollen, or draining pus  · Your wound looks more bruised or there is new bruising on the side of your leg or arm  · You have nausea or are vomiting  · Your skin is itchy, swollen, or you have a rash  · You have questions or concerns about your condition or care  Procedural Sedation   WHAT YOU NEED TO KNOW:   Procedural sedation is medicine used during procedures to help you feel relaxed and calm  You will remember little to none of the procedure  After sedation you may feel tired, weak, or unsteady on your feet  You may also have trouble concentrating or short-term memory loss  These symptoms should go away in 24 hours or less  DISCHARGE INSTRUCTIONS:     Call 911 or have someone else call for any of the following: You have sudden trouble breathing      You cannot be woken  Contact Interventional Radiology at [phone]   [facility] PATIENTS: Contact Interventional Radiology at [phone]) [facility]govern PATIENTS: Contact Interventional Radiology at [phone]) if any of the following occur: You have a severe headache or dizziness      Your heart is beating faster than usual     You have a fever or chills      Your skin is itchy, swollen, or you have a rash      You have nausea or are vomiting for more than 8 hours after the procedure       You have questions or concerns about your condition or care  Self-care:   Have someone stay with you for 24 hours  This person can drive you to errands and help you do things around the house  This person can also watch for problems       Rest and do quiet activities for 24 hours  Do not exercise, ride a bike, or play sports  Stand up slowly to prevent dizziness and falls  Take short walks around the house with another person  Slowly return to your usual activities the next day       Do not drive or use dangerous machines or tools for 24 hours  You may injure yourself or others  Examples include a lawnmower, saw, or drill  Do not return to work for 24 hours if you use dangerous machines or tools for work       Do not make important decisions for 24 hours  For example, do not sign important papers or invest money       Drink liquids as directed  Liquids help flush the sedation medicine out of your body  Ask how much liquid to drink each day and which liquids are best for you       Eat small, frequent meals to prevent nausea and vomiting  Start with clear liquids such as juice or broth   If you do not vomit after clear liquids, you can eat your usual foods       Do not drink alcohol or take medicines that make you drowsy  This includes medicines that help you sleep and anxiety medicines  Ask your healthcare provider if it is safe for you to take pain medicine  Follow up with your healthcare provider as directed: Write down your questions so you remember to ask them during your visits     ·

## 2020-02-07 NOTE — INTERVAL H&P NOTE
H&P reviewed  After examining the patient I find no changes in the patients condition since the H&P had been written      Vitals:    02/07/20 0857   BP: 164/87   Pulse: 69   Resp: 22   SpO2: 99%

## 2020-02-10 ENCOUNTER — HOSPITAL ENCOUNTER (OUTPATIENT)
Dept: NON INVASIVE DIAGNOSTICS | Facility: HOSPITAL | Age: 65
Discharge: HOME | End: 2020-02-10
Attending: INTERNAL MEDICINE
Payer: MEDICARE

## 2020-02-10 DIAGNOSIS — R06.02 SHORTNESS OF BREATH: ICD-10-CM

## 2020-02-10 DIAGNOSIS — Z82.49 FAMILY HISTORY OF EARLY CAD: ICD-10-CM

## 2020-02-10 DIAGNOSIS — R07.9 CHEST PAIN, UNSPECIFIED TYPE: ICD-10-CM

## 2020-02-10 PROCEDURE — 93306 TTE W/DOPPLER COMPLETE: CPT

## 2020-02-11 DIAGNOSIS — I10 ESSENTIAL HYPERTENSION: Primary | ICD-10-CM

## 2020-02-11 PROCEDURE — 93306 TTE W/DOPPLER COMPLETE: CPT | Performed by: INTERNAL MEDICINE

## 2020-02-11 NOTE — TELEPHONE ENCOUNTER
----- Message from Talia Greenwood MD sent at 2/11/2020 10:43 AM EST -----  Patient's echo shows normal LV systolic function  No significant valvular disease  Patient can keep an appointment  Please call patient about echo report

## 2020-02-12 NOTE — TELEPHONE ENCOUNTER
Patient has nonobstructive coronary artery disease  Maximum stenosis around 60% and left circumflex  Continue medical Rx and she should keep an appointment  She has normal LV systolic function

## 2020-02-26 NOTE — TELEPHONE ENCOUNTER
S/w pt made aware of results - pt d/c'd in Dec  2019 the toprol-XL due to palps and SOB - wants to know if you can prescribe an alternative (lopressor 50 mg BID) - she was on this prior to the toprol-XL - advise    Please contact pt when sent to [facility] in [facility] and leave a message when done

## 2020-03-03 RX ORDER — METOPROLOL TARTRATE 50 MG/1
50 TABLET, FILM COATED ORAL EVERY 12 HOURS SCHEDULED
Qty: 60 TABLET | Refills: 5 | Status: SHIPPED | OUTPATIENT
Start: 2020-03-03 | End: 2020-11-23 | Stop reason: SDUPTHER

## 2020-03-04 NOTE — TELEPHONE ENCOUNTER
Spoke w/Tammy;she requested Metoprolol be sent to [facility]  I called [facility];they put the Rx on Hold and stated, pt has to call [place] to specify the pull it from their system for transfer  I spoke with -Joanna at patient's home;she will have pt call

## 2020-05-18 ENCOUNTER — TELEMEDICINE (OUTPATIENT)
Dept: FAMILY MEDICINE CLINIC | Facility: CLINIC | Age: 65
End: 2020-05-18
Payer: MEDICARE

## 2020-05-18 DIAGNOSIS — Z20.828 EXPOSURE TO SARS-ASSOCIATED CORONAVIRUS: ICD-10-CM

## 2020-05-18 DIAGNOSIS — Z20.822 SUSPECTED COVID-19 VIRUS INFECTION: Primary | ICD-10-CM

## 2020-05-18 PROCEDURE — G2012 BRIEF CHECK IN BY MD/QHP: HCPCS | Performed by: NURSE PRACTITIONER

## 2020-05-19 DIAGNOSIS — Z20.828 EXPOSURE TO SARS-ASSOCIATED CORONAVIRUS: ICD-10-CM

## 2020-05-20 ENCOUNTER — TELEPHONE (OUTPATIENT)
Dept: FAMILY MEDICINE CLINIC | Facility: CLINIC | Age: 65
End: 2020-05-20

## 2020-05-20 LAB — CORONAVIRUS 2019-NCOV: NOT DETECTED

## 2020-07-11 ENCOUNTER — AMB VIDEO VISIT (OUTPATIENT)
Dept: OTHER | Facility: HOSPITAL | Age: 65
End: 2020-07-11

## 2020-07-11 PROCEDURE — EVISIT: Performed by: SPECIALIST

## 2020-07-11 NOTE — CARE ANYWHERE EVISITS
Visit Summary for DIOMEDES HOOKS - Gender: Female - Date of Birth: [id number]  Date: [id number] - Duration: 8 minutes  Patient: DIOMEDES HOOKS  Provider: Kandace Mcnamara    Patient Contact Information  Address  [place]; [address]  [phone]    Visit Topics  Infection in mouth: other [Added By: Self - 2020-07-11]    Triage Questions   Have you had any international travel in the last 14 days? Answer [No]  Have you had any exposure to a known or expected Covid-19 patient in the last 14 days? Answer [No]  Do you have any immune system compromise or chronic lung disease? Answer [No]  Do you have any vulnerable family members in the home (infant, pregnant, cancer, elderly)? Answer [No]     Conversation Transcripts  [0A][0A] [Notification] Russ Issa, Global Staff, will help you prepare for your visit  She is assisting Kandace Mcnamara Family Physician [0A][Beth Morocho] [place], and thank you for connecting  While you are waiting for the doctor, are there any   questions I can answer for you about our service? Please contact customer service if you have questions about billing, insurance, or technical issues  Visits work best with a stable WiFi connection, so please make sure you are connected before we   begin [0A][Notification] Russ Issa has left the room  [0A][Notification] You are connected with [facility] [0A][Notification] Woody Barrera is located in [place]  [0A][Notification] Woody Barrera has shared health   history  Chadd Irizarry  [0A][Notification] Charley Dose has added a prescription [0A][Notification] Charley Dose has added a prescription [0A][Notification] Charley Dose has removed a prescription [0A][Notification] Charley Dose has added a   prescription [0A][Notification] Charley Dose has added a diagnosis/procedure code  [0A]    Diagnosis  Jaw pain  Cellulitis and abscess of mouth    Procedures  Value: [id number] Code: CPT-4 ONLINE E/M BY PHYS/QHP    Medications Prescribed    Naprosyn  Dose : 1 tablet  Route : oral  Frequency : 2 times a day  Until directed to stop  Patient Instructions : take with food  Refills : 1  Instructions to the Pharmacist : Substitutions allowed    Augmentin  Dose : 1 tablet  Route : oral  Frequency : every 12 hours  Patient Instructions : take with food  Refills : 0  Instructions to the Pharmacist : Substitutions allowed    metoprolol tartrate      Frequency :             Provider Notes  [0A][0A] [0A]We strongly encourage you to share the following record of today's visit with your primary care physician  [0A][0A][0A][0A]Contact phone number: [0A][0A][0A][0A]Mode of Communication: [place],  mobile, calling from a residential program, staff   member with her[0A][0A][0A][0A]HPI: she has no teeth, she had a root canal upper L jaw a long time agonow has pain and swelling, no fevers or foul taste but she has had infections before and it feels the same[0A]she has been chewing on her gums only for   2 months  she tries to alternate when you can[0A]she plans to see the dentist but they are in a quarantine and she cannot get out[0A]uses mouth rinses regularly and brushes[0A]non smoker for one year[0A][0A][0A][0A][0A][0A]PMH: healthy   woman[0A]HTN[0A]HEP C[0A][0A]PSH:  [0A][0A]Meds: metoprolol[0A][0A]Allergies: NKDA[0A][0A][0A][0A]Exam: wt150[0A][0A]Gen: Alert, normal mental status and interaction, no visible distress, non- toxic appearance  [0A]under L eye there is swelling of the   cheek visible, nl color[0A]it is tender to self pressing over this area  [0A]neck, under jaw on L is non tender to self massage but her L ear has some discomfort [0A][0A][0A][0A]Assessment: L sided jaw pain, oral infection likely[0A][0A][0A][0A]Plan:   educated about nature of this illness and typical course[0A]rinses [0A]soft diet: yogurt, ice cream etc[0A][0A]1  I am sending you a prescription as described below   [0A]RX augmentin 875 with food 2 x ad for 7d[0A]eat yogurt daily and watch sun exposure   when on ABX  if you use probiotics, you can restart them after you have finished your antibiotic course, thank you! [0A]rx naprosyn 500 mg with food 2 x ad for 3 d then as needed for pain   [0A]2  Discussed precautions  [0A][0A][0A][0A]Follow   up:[0A][0A]1  If there are any questions or problems with the prescription, call [phone] anytime for assistance  [0A][0A]2  Please re-connect for another online visit or see an in-person provider should your symptoms worsen or persist     [0A][0A]3  Taking a probiotic (either in pill form or by eating yogurt that contains probiotics) while using antibiotics can help prevent some of the troublesome side effects that antibiotics can sometimes cause [0A][0A]4  Please print a copy of this   note and send it to your regular doctor, or take it to your next visit so it may be included in your medical record  [0A][0A][0A][0A]Patient voiced understanding and agrees to plan [0A][0A][0A][0A]Please see your PCP on an annual basis  [0A]    Electronically signed by: Tommie Martin [phone])

## 2020-11-23 DIAGNOSIS — I10 ESSENTIAL HYPERTENSION: ICD-10-CM

## 2020-11-23 RX ORDER — METOPROLOL TARTRATE 50 MG/1
50 TABLET, FILM COATED ORAL EVERY 12 HOURS SCHEDULED
Qty: 180 TABLET | Refills: 0 | Status: SHIPPED | OUTPATIENT
Start: 2020-11-23